# Patient Record
Sex: MALE | URBAN - METROPOLITAN AREA
[De-identification: names, ages, dates, MRNs, and addresses within clinical notes are randomized per-mention and may not be internally consistent; named-entity substitution may affect disease eponyms.]

---

## 2017-11-01 ENCOUNTER — APPOINTMENT (OUTPATIENT)
Age: 42
Setting detail: DERMATOLOGY
End: 2017-11-01

## 2017-11-01 DIAGNOSIS — L20.89 OTHER ATOPIC DERMATITIS: ICD-10-CM

## 2017-11-01 DIAGNOSIS — L98.8 OTHER SPECIFIED DISORDERS OF THE SKIN AND SUBCUTANEOUS TISSUE: ICD-10-CM

## 2017-11-01 DIAGNOSIS — D22 MELANOCYTIC NEVI: ICD-10-CM

## 2017-11-01 PROBLEM — L20.84 INTRINSIC (ALLERGIC) ECZEMA: Status: ACTIVE | Noted: 2017-11-01

## 2017-11-01 PROBLEM — D48.5 NEOPLASM OF UNCERTAIN BEHAVIOR OF SKIN: Status: ACTIVE | Noted: 2017-11-01

## 2017-11-01 PROCEDURE — 99213 OFFICE O/P EST LOW 20 MIN: CPT | Mod: 25

## 2017-11-01 PROCEDURE — ? PRESCRIPTION SAMPLES PROVIDED

## 2017-11-01 PROCEDURE — ? BIOPSY BY SHAVE METHOD

## 2017-11-01 PROCEDURE — 11100: CPT

## 2017-11-01 PROCEDURE — ? COUNSELING

## 2017-11-01 PROCEDURE — ? PRESCRIPTION

## 2017-11-01 RX ORDER — PIMECROLIMUS 10 MG/G
CREAM TOPICAL
Qty: 1 | Refills: 2 | Status: ERX | COMMUNITY
Start: 2017-11-01

## 2017-11-01 RX ORDER — DESONIDE 0.5 MG/G
LOTION TOPICAL
Qty: 1 | Refills: 2 | Status: ERX | COMMUNITY
Start: 2017-11-01

## 2017-11-01 RX ADMIN — PIMECROLIMUS: 10 CREAM TOPICAL at 16:38

## 2017-11-01 RX ADMIN — DESONIDE: 0.5 LOTION TOPICAL at 16:38

## 2017-11-01 ASSESSMENT — LOCATION DETAILED DESCRIPTION DERM
LOCATION DETAILED: RIGHT MEDIAL PLANTAR MIDFOOT
LOCATION DETAILED: LEFT MEDIAL MALAR CHEEK
LOCATION DETAILED: RIGHT INFERIOR CENTRAL MALAR CHEEK

## 2017-11-01 ASSESSMENT — LOCATION SIMPLE DESCRIPTION DERM
LOCATION SIMPLE: LEFT CHEEK
LOCATION SIMPLE: RIGHT PLANTAR SURFACE
LOCATION SIMPLE: RIGHT CHEEK

## 2017-11-01 ASSESSMENT — LOCATION ZONE DERM
LOCATION ZONE: FACE
LOCATION ZONE: FEET

## 2017-11-01 NOTE — PROCEDURE: MIPS QUALITY
Quality 111:Pneumonia Vaccination Status For Older Adults: Pneumococcal Vaccination Previously Received
Quality 110: Preventive Care And Screening: Influenza Immunization: Influenza immunization was not ordered or administered, reason not given
Quality 47: Advance Care Plan: Advance Care Planning discussed and documented; advance care plan or surrogate decision maker documented in the medical record.
Detail Level: Detailed

## 2017-11-01 NOTE — HPI: OTHER
Condition:: Bottom of right foot, patient is complaining of sharp pain when he wears flip flops
Please Describe Your Condition:: Pain for the past few months.  Denies no injury to his foot

## 2017-11-01 NOTE — PROCEDURE: BIOPSY BY SHAVE METHOD
Notification Instructions: Patient will be notified of biopsy results. However, patient instructed to call the office if not contacted within 2 weeks.
Curettage Text: The wound bed was treated with curettage and electrodesication X 3  after the biopsy was performed.
Destruction After The Procedure: No
Size Of Lesion In Cm: 0
Wound Care: Petrolatum
Detail Level: Detailed
Type Of Destruction Used: Curettage
Consent: Verbal consent was obtained and risks were reviewed including but not limited to scarring, infection, bleeding, scabbing, incomplete removal, nerve damage and allergy to anesthesia.
Post-Care Instructions: I reviewed with the patient in detail post-care instructions. Patient is to keep the biopsy site dry overnight, and then apply bacitracin twice daily until healed. Patient may apply hydrogen peroxide soaks to remove any crusting.
Electrodesiccation Text: The wound bed was treated with electrodesiccation after the biopsy was performed.
Anesthesia Volume In Cc: 0.2
Cryotherapy Text: The wound bed was treated with 3 rounds of at least 1 minute cycles of cryotherapy after the biopsy was performed.
Anesthesia Type: 2% lidocaine with epinephrine and a 1:10 solution of 8.4% sodium bicarbonate
Dressing: bandage
Hemostasis: Electrocautery and Aluminum Chloride
Biopsy Type: H and E
Electrodesiccation And Curettage Text: The wound bed was treated with electrodesiccation and curettage  X 3 after the biopsy was performed.
Billing Type: Third-Party Bill
Silver Nitrate Text: The wound bed was treated with silver nitrate after the biopsy was performed.
Render Post-Care Instructions In Note?: yes
Biopsy Method: Double edge Personna blades

## 2017-11-17 ENCOUNTER — NEW PATIENT (OUTPATIENT)
Age: 42
End: 2017-11-17

## 2017-11-17 DIAGNOSIS — H40.053: ICD-10-CM

## 2017-11-17 DIAGNOSIS — H25.013: ICD-10-CM

## 2017-11-17 DIAGNOSIS — D23.11: ICD-10-CM

## 2017-11-17 PROCEDURE — 92250 FUNDUS PHOTOGRAPHY W/I&R: CPT

## 2017-11-17 PROCEDURE — 99204 OFFICE O/P NEW MOD 45 MIN: CPT

## 2017-11-17 ASSESSMENT — TONOMETRY
OS_IOP_MMHG: 21
OD_IOP_MMHG: 21

## 2017-11-17 ASSESSMENT — PACHYMETRY
OS_CT_UM: 604
OD_CT_UM: 610

## 2017-11-17 ASSESSMENT — VISUAL ACUITY
OS_SC: 20/25-2
OD_SC: 20/40

## 2019-11-22 ENCOUNTER — ESTABLISHED COMPREHENSIVE EXAM (OUTPATIENT)
Age: 44
End: 2019-11-22

## 2019-11-22 DIAGNOSIS — H25.013: ICD-10-CM

## 2019-11-22 DIAGNOSIS — D23.111: ICD-10-CM

## 2019-11-22 DIAGNOSIS — H40.053: ICD-10-CM

## 2019-11-22 PROCEDURE — 92133 CPTRZD OPH DX IMG PST SGM ON: CPT

## 2019-11-22 PROCEDURE — 99214 OFFICE O/P EST MOD 30 MIN: CPT

## 2019-11-22 ASSESSMENT — VISUAL ACUITY
OD_SC: 20/40
OS_SC: 20/25

## 2019-11-22 ASSESSMENT — TONOMETRY
OS_IOP_MMHG: 19
OD_IOP_MMHG: 19

## 2022-09-22 ENCOUNTER — ESTABLISHED COMPREHENSIVE EXAM (OUTPATIENT)
Dept: URBAN - METROPOLITAN AREA CLINIC 45 | Facility: CLINIC | Age: 47
End: 2022-09-22

## 2022-09-22 DIAGNOSIS — D23.111: ICD-10-CM

## 2022-09-22 DIAGNOSIS — H40.053: ICD-10-CM

## 2022-09-22 DIAGNOSIS — H25.013: ICD-10-CM

## 2022-09-22 PROCEDURE — 99213 OFFICE O/P EST LOW 20 MIN: CPT

## 2022-09-22 PROCEDURE — 92133 CPTRZD OPH DX IMG PST SGM ON: CPT

## 2022-09-22 PROCEDURE — 76514 ECHO EXAM OF EYE THICKNESS: CPT

## 2022-09-22 PROCEDURE — 92083 EXTENDED VISUAL FIELD XM: CPT

## 2022-09-22 ASSESSMENT — TONOMETRY
OS_IOP_MMHG: 20
OD_IOP_MMHG: 22

## 2022-09-22 ASSESSMENT — PACHYMETRY
OS_CT_UM: 604
OD_CT_UM: 610

## 2022-09-22 ASSESSMENT — VISUAL ACUITY
OD_SC: 20/50-1
OS_SC: 20/50+2

## 2023-09-28 ENCOUNTER — 1 YEAR COMPLETE EXAM (OUTPATIENT)
Dept: URBAN - METROPOLITAN AREA CLINIC 45 | Facility: CLINIC | Age: 48
End: 2023-09-28

## 2023-09-28 DIAGNOSIS — D23.111: ICD-10-CM

## 2023-09-28 DIAGNOSIS — H40.053: ICD-10-CM

## 2023-09-28 DIAGNOSIS — H25.013: ICD-10-CM

## 2023-09-28 PROCEDURE — 92014 COMPRE OPH EXAM EST PT 1/>: CPT

## 2023-09-28 ASSESSMENT — VISUAL ACUITY
OD_SC: 20/50+2
OS_SC: 20/40-2

## 2023-09-28 ASSESSMENT — TONOMETRY
OD_IOP_MMHG: 23
OS_IOP_MMHG: 20

## 2024-12-02 ENCOUNTER — ESTABLISHED COMPREHENSIVE EXAM (OUTPATIENT)
Dept: URBAN - METROPOLITAN AREA CLINIC 71 | Facility: CLINIC | Age: 49
End: 2024-12-02

## 2024-12-02 DIAGNOSIS — H25.013: ICD-10-CM

## 2024-12-02 DIAGNOSIS — H40.053: ICD-10-CM

## 2024-12-02 DIAGNOSIS — D23.111: ICD-10-CM

## 2024-12-02 DIAGNOSIS — H52.4: ICD-10-CM

## 2024-12-02 PROCEDURE — 92014 COMPRE OPH EXAM EST PT 1/>: CPT

## 2024-12-02 PROCEDURE — 92133 CPTRZD OPH DX IMG PST SGM ON: CPT

## 2024-12-02 PROCEDURE — 92083 EXTENDED VISUAL FIELD XM: CPT

## 2024-12-02 ASSESSMENT — VISUAL ACUITY
OS_SC: 20/40
OD_SC: 20/40+2

## 2024-12-02 ASSESSMENT — TONOMETRY
OS_IOP_MMHG: 18
OD_IOP_MMHG: 21